# Patient Record
Sex: FEMALE | Race: WHITE | NOT HISPANIC OR LATINO | Employment: STUDENT | URBAN - METROPOLITAN AREA
[De-identification: names, ages, dates, MRNs, and addresses within clinical notes are randomized per-mention and may not be internally consistent; named-entity substitution may affect disease eponyms.]

---
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
## 2020-01-03 ENCOUNTER — APPOINTMENT (EMERGENCY)
Dept: CT IMAGING | Facility: HOSPITAL | Age: 20
End: 2020-01-03
Payer: COMMERCIAL

## 2020-01-03 ENCOUNTER — OFFICE VISIT (OUTPATIENT)
Dept: URGENT CARE | Facility: CLINIC | Age: 20
End: 2020-01-03
Payer: COMMERCIAL

## 2020-01-03 ENCOUNTER — APPOINTMENT (OUTPATIENT)
Dept: RADIOLOGY | Facility: CLINIC | Age: 20
End: 2020-01-03
Payer: COMMERCIAL

## 2020-01-03 ENCOUNTER — HOSPITAL ENCOUNTER (EMERGENCY)
Facility: HOSPITAL | Age: 20
Discharge: HOME | End: 2020-01-03
Attending: EMERGENCY MEDICINE | Admitting: EMERGENCY MEDICINE
Payer: COMMERCIAL

## 2020-01-03 ENCOUNTER — APPOINTMENT (EMERGENCY)
Dept: RADIOLOGY | Facility: HOSPITAL | Age: 20
End: 2020-01-03
Payer: COMMERCIAL

## 2020-01-03 VITALS
SYSTOLIC BLOOD PRESSURE: 165 MMHG | HEART RATE: 99 BPM | DIASTOLIC BLOOD PRESSURE: 110 MMHG | OXYGEN SATURATION: 98 % | TEMPERATURE: 98 F | RESPIRATION RATE: 19 BRPM

## 2020-01-03 VITALS
SYSTOLIC BLOOD PRESSURE: 124 MMHG | WEIGHT: 187 LBS | DIASTOLIC BLOOD PRESSURE: 82 MMHG | HEART RATE: 104 BPM | HEIGHT: 66 IN | OXYGEN SATURATION: 96 % | BODY MASS INDEX: 30.05 KG/M2 | RESPIRATION RATE: 18 BRPM | TEMPERATURE: 98.6 F

## 2020-01-03 DIAGNOSIS — R93.89 ABNORMAL CAT SCAN: ICD-10-CM

## 2020-01-03 DIAGNOSIS — R06.00 DYSPNEA: ICD-10-CM

## 2020-01-03 DIAGNOSIS — R06.2 WHEEZING: ICD-10-CM

## 2020-01-03 DIAGNOSIS — R05.9 COUGH: ICD-10-CM

## 2020-01-03 DIAGNOSIS — J40 BRONCHITIS: Primary | ICD-10-CM

## 2020-01-03 DIAGNOSIS — R06.02 SHORTNESS OF BREATH: Primary | ICD-10-CM

## 2020-01-03 LAB
ANION GAP SERPL CALCULATED.3IONS-SCNC: 12 MMOL/L (ref 4–13)
BASOPHILS # BLD AUTO: 0.07 THOUSANDS/ΜL (ref 0–0.1)
BASOPHILS NFR BLD AUTO: 1 % (ref 0–1)
BUN SERPL-MCNC: 9 MG/DL (ref 5–25)
CALCIUM SERPL-MCNC: 9.3 MG/DL (ref 8.3–10.1)
CHLORIDE SERPL-SCNC: 105 MMOL/L (ref 100–108)
CO2 SERPL-SCNC: 23 MMOL/L (ref 21–32)
CREAT SERPL-MCNC: 0.59 MG/DL (ref 0.6–1.3)
D DIMER PPP FEU-MCNC: 0.62 UG/ML FEU
EOSINOPHIL # BLD AUTO: 1.73 THOUSAND/ΜL (ref 0–0.61)
EOSINOPHIL NFR BLD AUTO: 16 % (ref 0–6)
ERYTHROCYTE [DISTWIDTH] IN BLOOD BY AUTOMATED COUNT: 13.2 % (ref 11.6–15.1)
EXT PREG TEST URINE: NEGATIVE
EXT. CONTROL ED NAV: NORMAL
GFR SERPL CREATININE-BSD FRML MDRD: 133 ML/MIN/1.73SQ M
GLUCOSE SERPL-MCNC: 97 MG/DL (ref 65–140)
HCT VFR BLD AUTO: 41.3 % (ref 34.8–46.1)
HGB BLD-MCNC: 13.5 G/DL (ref 11.5–15.4)
IMM GRANULOCYTES # BLD AUTO: 0.03 THOUSAND/UL (ref 0–0.2)
IMM GRANULOCYTES NFR BLD AUTO: 0 % (ref 0–2)
LYMPHOCYTES # BLD AUTO: 1.69 THOUSANDS/ΜL (ref 0.6–4.47)
LYMPHOCYTES NFR BLD AUTO: 16 % (ref 14–44)
MCH RBC QN AUTO: 30.8 PG (ref 26.8–34.3)
MCHC RBC AUTO-ENTMCNC: 32.7 G/DL (ref 31.4–37.4)
MCV RBC AUTO: 94 FL (ref 82–98)
MONOCYTES # BLD AUTO: 0.53 THOUSAND/ΜL (ref 0.17–1.22)
MONOCYTES NFR BLD AUTO: 5 % (ref 4–12)
NEUTROPHILS # BLD AUTO: 6.68 THOUSANDS/ΜL (ref 1.85–7.62)
NEUTS SEG NFR BLD AUTO: 62 % (ref 43–75)
NRBC BLD AUTO-RTO: 0 /100 WBCS
PLATELET # BLD AUTO: 334 THOUSANDS/UL (ref 149–390)
PMV BLD AUTO: 9 FL (ref 8.9–12.7)
POTASSIUM SERPL-SCNC: 4 MMOL/L (ref 3.5–5.3)
RBC # BLD AUTO: 4.38 MILLION/UL (ref 3.81–5.12)
SODIUM SERPL-SCNC: 140 MMOL/L (ref 136–145)
WBC # BLD AUTO: 10.73 THOUSAND/UL (ref 4.31–10.16)

## 2020-01-03 PROCEDURE — 36415 COLL VENOUS BLD VENIPUNCTURE: CPT | Performed by: EMERGENCY MEDICINE

## 2020-01-03 PROCEDURE — 71275 CT ANGIOGRAPHY CHEST: CPT

## 2020-01-03 PROCEDURE — 71046 X-RAY EXAM CHEST 2 VIEWS: CPT

## 2020-01-03 PROCEDURE — 99285 EMERGENCY DEPT VISIT HI MDM: CPT | Performed by: EMERGENCY MEDICINE

## 2020-01-03 PROCEDURE — 93005 ELECTROCARDIOGRAM TRACING: CPT

## 2020-01-03 PROCEDURE — 99285 EMERGENCY DEPT VISIT HI MDM: CPT

## 2020-01-03 PROCEDURE — 80048 BASIC METABOLIC PNL TOTAL CA: CPT | Performed by: EMERGENCY MEDICINE

## 2020-01-03 PROCEDURE — 94640 AIRWAY INHALATION TREATMENT: CPT

## 2020-01-03 PROCEDURE — 99213 OFFICE O/P EST LOW 20 MIN: CPT | Performed by: PHYSICIAN ASSISTANT

## 2020-01-03 PROCEDURE — 85025 COMPLETE CBC W/AUTO DIFF WBC: CPT | Performed by: EMERGENCY MEDICINE

## 2020-01-03 PROCEDURE — 85379 FIBRIN DEGRADATION QUANT: CPT | Performed by: EMERGENCY MEDICINE

## 2020-01-03 PROCEDURE — 81025 URINE PREGNANCY TEST: CPT | Performed by: EMERGENCY MEDICINE

## 2020-01-03 RX ORDER — PREDNISONE 20 MG/1
40 TABLET ORAL DAILY
Qty: 10 TABLET | Refills: 0 | Status: SHIPPED | OUTPATIENT
Start: 2020-01-03 | End: 2020-11-17

## 2020-01-03 RX ORDER — IPRATROPIUM BROMIDE AND ALBUTEROL SULFATE 2.5; .5 MG/3ML; MG/3ML
3 SOLUTION RESPIRATORY (INHALATION) ONCE
Status: COMPLETED | OUTPATIENT
Start: 2020-01-03 | End: 2020-01-03

## 2020-01-03 RX ADMIN — IPRATROPIUM BROMIDE AND ALBUTEROL SULFATE 3 ML: 2.5; .5 SOLUTION RESPIRATORY (INHALATION) at 12:36

## 2020-01-03 RX ADMIN — IOHEXOL 85 ML: 350 INJECTION, SOLUTION INTRAVENOUS at 13:44

## 2020-01-03 NOTE — ED PROVIDER NOTES
History  Chief Complaint   Patient presents with    Shortness of Breath     Pt reports being treated for bronchittis last week, placed on Abx by urgent care  Began noticing one of her feet were swollen, and believes she might have had a DVT or PE because her SOB is worsening  Pt sent from urgent care over concern for wheezing, dyspnea, and unilateral leg (R foot) swelling  Pt was seen at  approx one week ago and started on zpak and steroids for dyspnea and wheezing  She reports no improvement in her sxs since that time  She denies hemoptysis, syncope, presyncope, and h/o VTE but she is on birth control  She has no documented h/o asthma but reports prior wheezing and has extensive h/o eczema as well as family h/o lung disease  Prior to Admission Medications   Prescriptions Last Dose Informant Patient Reported? Taking? albuterol (PROVENTIL HFA) 90 mcg/act inhaler   No No   Sig: Inhale 2 puffs every 6 (six) hours as needed for wheezing   clobetasol propionate (OLUX-E) 0 05 % topical foam   Yes No   Sig: APPLY TO ECZEMA ON HANDS & LEGS TWICE A DAY X2WKS, THEN 2-3 DAYS A WK AS NEEDED FOR SEVERE FLARE UPS   drospirenone-ethinyl estradiol (ALLYSON) 3-0 03 MG per tablet   Yes No   Sig: Take 1 tablet by mouth daily      Facility-Administered Medications: None       Past Medical History:   Diagnosis Date    Eczema     UTI (urinary tract infection)        Past Surgical History:   Procedure Laterality Date    ADENOIDECTOMY      TONSILLECTOMY         Family History   Problem Relation Age of Onset    Hypertension Mother     COPD Father      I have reviewed and agree with the history as documented  Social History     Tobacco Use    Smoking status: Light Tobacco Smoker    Smokeless tobacco: Never Used   Substance Use Topics    Alcohol use: No    Drug use: Yes     Types: Marijuana        Review of Systems   Constitutional: Negative for chills and fever     Respiratory: Positive for cough, shortness of breath and wheezing  Negative for apnea, choking, chest tightness and stridor  Cardiovascular: Positive for leg swelling  Negative for chest pain and palpitations  All other systems reviewed and are negative  Physical Exam  Physical Exam   Constitutional: She is oriented to person, place, and time  She appears well-developed and well-nourished  No distress  HENT:   Head: Normocephalic and atraumatic  Eyes: Pupils are equal, round, and reactive to light  Conjunctivae and EOM are normal    Neck: Normal range of motion  Neck supple  No JVD present  Cardiovascular: Normal rate, regular rhythm, normal heart sounds and intact distal pulses  Pulmonary/Chest: Effort normal  No stridor  No respiratory distress  She has wheezes  She has no rales  She exhibits no tenderness  Abdominal: Soft  She exhibits no distension  There is no tenderness  There is no rebound and no guarding  Musculoskeletal: Normal range of motion  She exhibits no edema, tenderness or deformity  Neurological: She is alert and oriented to person, place, and time  No cranial nerve deficit or sensory deficit  She exhibits normal muscle tone  Coordination normal    Skin: Skin is warm and dry  Capillary refill takes less than 2 seconds  No rash noted  She is not diaphoretic  No erythema  No pallor  Nursing note and vitals reviewed        Vital Signs  ED Triage Vitals [01/03/20 1115]   Temperature Pulse Respirations Blood Pressure SpO2   98 °F (36 7 °C) 99 19 (!) 165/110 98 %      Temp src Heart Rate Source Patient Position - Orthostatic VS BP Location FiO2 (%)   -- Monitor Sitting Left arm --      Pain Score       --           Vitals:    01/03/20 1115   BP: (!) 165/110   Pulse: 99   Patient Position - Orthostatic VS: Sitting         Visual Acuity      ED Medications  Medications   ipratropium-albuterol (DUO-NEB) 0 5-2 5 mg/3 mL inhalation solution 3 mL (3 mL Nebulization Given 1/3/20 1236)   iohexol (OMNIPAQUE) 350 MG/ML injection (MULTI-DOSE) 85 mL (85 mL Intravenous Given 1/3/20 1344)       Diagnostic Studies  Results Reviewed     Procedure Component Value Units Date/Time    Basic metabolic panel [[id number]]  (Abnormal) Collected:  01/03/20 1234    Lab Status:  Final result Specimen:  Blood from Arm, Right Updated:  01/03/20 1305     Sodium 140 mmol/L      Potassium 4 0 mmol/L      Chloride 105 mmol/L      CO2 23 mmol/L      ANION GAP 12 mmol/L      BUN 9 mg/dL      Creatinine 0 59 mg/dL      Glucose 97 mg/dL      Calcium 9 3 mg/dL      eGFR 133 ml/min/1 73sq m     Narrative:       [place] guidelines for Chronic Kidney Disease (CKD):     Stage 1 with normal or high GFR (GFR > 90 mL/min/1 73 square meters)    Stage 2 Mild CKD (GFR = 60-89 mL/min/1 73 square meters)    Stage 3A Moderate CKD (GFR = 45-59 mL/min/1 73 square meters)    Stage 3B Moderate CKD (GFR = 30-44 mL/min/1 73 square meters)    Stage 4 Severe CKD (GFR = 15-29 mL/min/1 73 square meters)    Stage 5 End Stage CKD (GFR <15 mL/min/1 73 square meters)  Note: GFR calculation is accurate only with a steady state creatinine    CBC and differential [[id number]]  (Abnormal) Collected:  01/03/20 1234    Lab Status:  Final result Specimen:  Blood from Arm, Right Updated:  01/03/20 1255     WBC 10 73 Thousand/uL      RBC 4 38 Million/uL      Hemoglobin 13 5 g/dL      Hematocrit 41 3 %      MCV 94 fL      MCH 30 8 pg      MCHC 32 7 g/dL      RDW 13 2 %      MPV 9 0 fL      Platelets 011 Thousands/uL      nRBC 0 /100 WBCs      Neutrophils Relative 62 %      Immat GRANS % 0 %      Lymphocytes Relative 16 %      Monocytes Relative 5 %      Eosinophils Relative 16 %      Basophils Relative 1 %      Neutrophils Absolute 6 68 Thousands/µL      Immature Grans Absolute 0 03 Thousand/uL      Lymphocytes Absolute 1 69 Thousands/µL      Monocytes Absolute 0 53 Thousand/µL      Eosinophils Absolute 1 73 Thousand/µL      Basophils Absolute 0 07 Thousands/µL     D-dimer, quantitative [[id number]]  (Abnormal) Collected:  01/03/20 1140    Lab Status:  Final result Specimen:  Blood from Arm, Right Updated:  01/03/20 1209     D-Dimer, Quant 0 62 ug/ml FEU     POCT pregnancy, urine [[id number]]  (Normal) Resulted:  01/03/20 1153    Lab Status:  Final result Updated:  01/03/20 1153     EXT PREG TEST UR (Ref: Negative) negative     Control valid                 CTA ED chest PE study   Final Result by Alma Peters MD (01/03 1415)      No evidence of pulmonary embolus  Scattered bilateral groundglass opacities which could reflect pneumonia in appropriate clinical setting  Mediastinal and hilar lymphadenopathy, this may be reactive however consider CT follow-up to ensure resolution  Indeterminate 2 8 cm hepatic lesion for which hepatic protocol MRI is recommended  Workstation performed: [id number]         XR chest 2 views   Final Result by Wagner Gupta MD (01/03 1417)      No acute cardiopulmonary disease  Workstation performed: [id number]                    Procedures  ECG 12 Lead Documentation Only  Date/Time: 1/3/2020 11:44 AM  Performed by: Debbie Paredes MD  Authorized by: Debbie Paredes MD     Indications / Diagnosis:  106  ECG reviewed by me, the ED Provider: yes    Patient location:  ED  Previous ECG:     Previous ECG:  Unavailable  Interpretation:     Interpretation: non-specific    Rate:     ECG rate:  106    ECG rate assessment: tachycardic    Rhythm:     Rhythm: sinus rhythm    Comments:      ST  RBBB  No acute ischemic changes  No ekg available for comparison  ED Course                               MDM  Number of Diagnoses or Management Options  Abnormal CAT scan:   Bronchitis:   Dyspnea:   Wheezing:   Diagnosis management comments: Dyspnea and wheezing  Suspect viral uri w bronchitis  Discussed CT findings w pt and gave copy of CT results, will d/w PCP  Given wheezing will resume steroids           Disposition  Final diagnoses:   Bronchitis   Wheezing   Dyspnea   Abnormal CAT scan     Time reflects when diagnosis was documented in both MDM as applicable and the Disposition within this note     Time User Action Codes Description Comment    1/3/2020  2:20 PM Chairez, Hunter Add [J40] Bronchitis     1/3/2020  2:20 PM Dothan Ryan Add [R06 2] Wheezing     1/3/2020  2:21 PM Chairez, Hunter Add [R06 00] Dyspnea     1/3/2020  2:21 PM Dothan Ryan Add [R93 89] Abnormal CAT scan       ED Disposition     ED Disposition Condition Date/Time Comment    Discharge Stable Fri Chet 3, 2020  2:20 PM Lakesha Stevenson discharge to home/self care  Follow-up Information     Follow up With Specialties Details Why Contact Info    Satish Tee MD Emergency Medicine, Internal Medicine  have an mri of your liver performed  [address] [place] [address]  [phone]            Discharge Medication List as of 1/3/2020  2:22 PM      START taking these medications    Details   predniSONE 20 mg tablet Take 2 tablets (40 mg total) by mouth daily, Starting Fri 1/3/2020, Print         CONTINUE these medications which have NOT CHANGED    Details   albuterol (PROVENTIL HFA) 90 mcg/act inhaler Inhale 2 puffs every 6 (six) hours as needed for wheezing, Starting Fri 12/20/2019, Normal      clobetasol propionate (OLUX-E) 0 05 % topical foam APPLY TO ECZEMA ON HANDS & LEGS TWICE A DAY X2WKS, THEN 2-3 DAYS A WK AS NEEDED FOR SEVERE FLARE UPS, Historical Med      drospirenone-ethinyl estradiol (ALLYSON) 3-0 03 MG per tablet Take 1 tablet by mouth daily, Starting Thu 10/24/2019, Historical Med           No discharge procedures on file      ED Provider  Electronically Signed by           Mariah Jaimes MD  01/03/20 9679

## 2020-01-03 NOTE — PROGRESS NOTES
[facility]        NAME: Colleen Ramirez is a 23 y o  female  : 2000    MRN: [id number]  DATE: January 3, 2020  TIME: 10:43 AM    Assessment and Plan   Shortness of breath [R06 02]  1  Shortness of breath  XR chest pa & lateral    Transfer to other facility     CXR reviewed by myself; negative for pneumonia  Discussed case with Dr Ethel Diaz at [facility]  Patient will be transported to [facility] via personal vehicle  Patient Instructions     Follow up with PCP in 3-5 days  Proceed to  ER if symptoms worsen  Chief Complaint     Chief Complaint   Patient presents with    Cough     was here on  and given z pack and prednisone for URI  states she felt better but her symptoms returned and is now complaining of SOB, wheezing, and productive cough for dark green sputum  History of Present Illness       70-year-old female with no significant past medical history presents for evaluation of shortness of breath  Patient was seen and treated for a URI on 2019  She completed a course of Z-Farzad and steroids with no relief of symptoms  Patient has concerns for increased shortness of breath and wheezing  Patient states over the last 3 days the shortness of breath has significantly worsened  Patient also shares with me that prior to being seen on 2019 she had right lower extremity swelling and warmth  Patient showed me a picture of the right foot that was swollen and warm to the touch that lasted about 4 days  Patient is currently taking oral contraceptives  Denies recent travel  Denies fever or chills  Review of Systems   Review of Systems   Constitutional: Negative for chills, fatigue and fever  HENT: Positive for congestion  Negative for ear pain, sinus pain, sore throat and trouble swallowing  Eyes: Negative for pain, discharge and redness  Respiratory: Positive for cough, shortness of breath and wheezing  Negative for chest tightness  Cardiovascular: Negative for chest pain, palpitations and leg swelling  Gastrointestinal: Negative for abdominal pain, diarrhea, nausea and vomiting  Musculoskeletal: Negative for arthralgias, joint swelling and myalgias  Skin: Negative for rash  Neurological: Negative for dizziness, weakness, numbness and headaches  Current Medications       Current Outpatient Medications:     albuterol (PROVENTIL HFA) 90 mcg/act inhaler, Inhale 2 puffs every 6 (six) hours as needed for wheezing, Disp: 6 7 g, Rfl: 0    clobetasol propionate (OLUX-E) 0 05 % topical foam, APPLY TO ECZEMA ON HANDS & LEGS TWICE A DAY X2WKS, THEN 2-3 DAYS A WK AS NEEDED FOR SEVERE FLARE UPS, Disp: , Rfl: 2    drospirenone-ethinyl estradiol (ALLYSON) 3-0 03 MG per tablet, Take 1 tablet by mouth daily, Disp: , Rfl: 3    Current Allergies     Allergies as of 01/03/2020    (No Known Allergies)            The following portions of the patient's history were reviewed and updated as appropriate: allergies, current medications, past family history, past medical history, past social history, past surgical history and problem list      Past Medical History:   Diagnosis Date    Eczema     UTI (urinary tract infection)        Past Surgical History:   Procedure Laterality Date    ADENOIDECTOMY      TONSILLECTOMY         Family History   Problem Relation Age of Onset    Hypertension Mother     COPD Father          Medications have been verified  Objective   /82 (BP Location: Left arm, Patient Position: Sitting)   Pulse 104   Temp 98 6 °F (37 °C) (Temporal)   Resp 18   Ht 5' 6" (1 676 m)   Wt 84 8 kg (187 lb)   SpO2 96%   BMI 30 18 kg/m²        Physical Exam     Physical Exam   Constitutional: She is oriented to person, place, and time  She appears well-developed and well-nourished  No distress  HENT:   Head: Normocephalic     Right Ear: External ear normal    Left Ear: External ear normal    Mouth/Throat: Oropharynx is clear and moist    Eyes: Pupils are equal, round, and reactive to light  Conjunctivae and EOM are normal    Neck: Normal range of motion  Neck supple  Cardiovascular: Regular rhythm and normal heart sounds  Tachycardia present  No murmur heard  Pulmonary/Chest: Effort normal  No respiratory distress  She has wheezes  She has no rhonchi  She has no rales  Abdominal: Soft  Bowel sounds are normal  There is no tenderness  Musculoskeletal: Normal range of motion  Lymphadenopathy:     She has no cervical adenopathy  Neurological: She is alert and oriented to person, place, and time  She has normal reflexes  Skin: Skin is warm and dry  Psychiatric: She has a normal mood and affect  Nursing note and vitals reviewed

## 2020-01-04 LAB
ATRIAL RATE: 106 BPM
P AXIS: 69 DEGREES
PR INTERVAL: 116 MS
QRS AXIS: 80 DEGREES
QRSD INTERVAL: 98 MS
QT INTERVAL: 324 MS
QTC INTERVAL: 430 MS
T WAVE AXIS: 56 DEGREES
VENTRICULAR RATE: 106 BPM

## 2020-01-04 PROCEDURE — 93010 ELECTROCARDIOGRAM REPORT: CPT | Performed by: INTERNAL MEDICINE

## 2020-05-17 ENCOUNTER — TELEPHONE (OUTPATIENT)
Dept: OTHER | Facility: OTHER | Age: 20
End: 2020-05-17

## 2020-07-27 ENCOUNTER — OFFICE VISIT (OUTPATIENT)
Dept: URGENT CARE | Facility: CLINIC | Age: 20
End: 2020-07-27
Payer: COMMERCIAL

## 2020-07-27 VITALS
RESPIRATION RATE: 18 BRPM | HEART RATE: 92 BPM | SYSTOLIC BLOOD PRESSURE: 118 MMHG | HEIGHT: 66 IN | TEMPERATURE: 98.2 F | WEIGHT: 195 LBS | DIASTOLIC BLOOD PRESSURE: 78 MMHG | BODY MASS INDEX: 31.34 KG/M2 | OXYGEN SATURATION: 97 %

## 2020-07-27 DIAGNOSIS — R30.0 DYSURIA: Primary | ICD-10-CM

## 2020-07-27 LAB
SL AMB  POCT GLUCOSE, UA: ABNORMAL
SL AMB LEUKOCYTE ESTERASE,UA: ABNORMAL
SL AMB POCT BILIRUBIN,UA: ABNORMAL
SL AMB POCT BLOOD,UA: ABNORMAL
SL AMB POCT CLARITY,UA: ABNORMAL
SL AMB POCT COLOR,UA: ABNORMAL
SL AMB POCT KETONES,UA: ABNORMAL
SL AMB POCT NITRITE,UA: ABNORMAL
SL AMB POCT PH,UA: 5
SL AMB POCT SPECIFIC GRAVITY,UA: 1.01
SL AMB POCT URINE PROTEIN: 30
SL AMB POCT UROBILINOGEN: 0.2

## 2020-07-27 PROCEDURE — 87077 CULTURE AEROBIC IDENTIFY: CPT | Performed by: PREVENTIVE MEDICINE

## 2020-07-27 PROCEDURE — 99213 OFFICE O/P EST LOW 20 MIN: CPT | Performed by: PREVENTIVE MEDICINE

## 2020-07-27 PROCEDURE — 81002 URINALYSIS NONAUTO W/O SCOPE: CPT | Performed by: PREVENTIVE MEDICINE

## 2020-07-27 PROCEDURE — 87186 SC STD MICRODIL/AGAR DIL: CPT | Performed by: PREVENTIVE MEDICINE

## 2020-07-27 PROCEDURE — 87086 URINE CULTURE/COLONY COUNT: CPT | Performed by: PREVENTIVE MEDICINE

## 2020-07-27 RX ORDER — PHENAZOPYRIDINE HYDROCHLORIDE 100 MG/1
100 TABLET, FILM COATED ORAL 3 TIMES DAILY PRN
Qty: 20 TABLET | Refills: 0 | Status: SHIPPED | OUTPATIENT
Start: 2020-07-27 | End: 2020-11-17

## 2020-07-27 RX ORDER — SULFAMETHOXAZOLE AND TRIMETHOPRIM 800; 160 MG/1; MG/1
1 TABLET ORAL EVERY 12 HOURS SCHEDULED
Qty: 10 TABLET | Refills: 0 | Status: SHIPPED | OUTPATIENT
Start: 2020-07-27 | End: 2020-08-01

## 2020-07-27 NOTE — PATIENT INSTRUCTIONS
Dysuria   AMBULATORY CARE:   Dysuria  is trouble urinating, or pain, burning, or discomfort when you urinate  Dysuria is usually a symptom of another problem, such as a blockage or urinary tract infection  Common symptoms include the following:   · Fever     · Cloudy, bad smelling urine     · Urge to urinate often but urinating little     · Back, side, or abdominal pain     · Blood in your urine     · Discharge that smells bad     · Itching  Seek care immediately if:   · You have severe back, side, or abdominal pain  · You have fever and shaking chills  · You vomit several times in a row  Contact your healthcare provider if:   · Your symptoms do not go away, even after treatment  · You have questions or concerns about your condition or care  Treatment for dysuria  may include medicines to treat a bacterial infection or help decrease bladder spasms  Manage your dysuria:   · Drink more liquids  Liquids help flush out bacteria that may be causing an infection  Ask your healthcare provider how much liquid to drink each day and which liquids are best for you  · Take sitz baths as directed  Fill a bathtub with 4 to 6 inches of warm water  You may also use a sitz bath pan that fits over a toilet  Sit in the sitz bath for 20 minutes  Do this 2 to 3 times a day, or as directed  The warm water can help decrease pain and swelling  Follow up with your healthcare provider as directed:  Write down your questions so you remember to ask them during your visits  © 2017 [employer] Information is for End User's use only and may not be sold, redistributed or otherwise used for commercial purposes  All illustrations and images included in CareNotes® are the copyrighted property of [employer] , [employer]  or Leroy Keene  The above information is an  only  It is not intended as medical advice for individual conditions or treatments   Talk to your doctor, nurse or pharmacist before following any medical regimen to see if it is safe and effective for you

## 2020-07-29 NOTE — PROGRESS NOTES
[facility]        NAME: Ya Means is a 21 y o  female  : 2000    MRN: [id number]  DATE: 2020  TIME: 10:41 AM    Assessment and Plan   Dysuria [R30 0]  1  Dysuria  POCT urine dip    Urine culture    sulfamethoxazole-trimethoprim (BACTRIM DS) 800-160 mg per tablet    phenazopyridine (PYRIDIUM) 100 mg tablet    Ambulatory referral to Urology         Patient Instructions       Follow up with PCP in 3-5 days  Proceed to  ER if symptoms worsen  Chief Complaint     Chief Complaint   Patient presents with    Possible UTI     symptoms started friday with painful urintation and bladder pain         History of Present Illness       Urinary Tract Infection    This is a new problem  The current episode started in the past 7 days  The problem occurs every urination  The problem has been unchanged  The quality of the pain is described as burning  The pain is at a severity of 5/10  The pain is moderate  There has been no fever  She is not sexually active  There is no history of pyelonephritis  Associated symptoms include flank pain, frequency and urgency  She has tried nothing for the symptoms  The treatment provided no relief  Review of Systems   Review of Systems   Constitutional: Negative  Respiratory: Negative  Cardiovascular: Negative  Genitourinary: Positive for flank pain, frequency and urgency  All other systems reviewed and are negative          Current Medications       Current Outpatient Medications:     albuterol (PROVENTIL HFA) 90 mcg/act inhaler, Inhale 2 puffs every 6 (six) hours as needed for wheezing (Patient not taking: Reported on 2020), Disp: 6 7 g, Rfl: 0    clobetasol propionate (OLUX-E) 0 05 % topical foam, APPLY TO ECZEMA ON HANDS & LEGS TWICE A DAY X2WKS, THEN 2-3 DAYS A WK AS NEEDED FOR SEVERE FLARE UPS, Disp: , Rfl: 2    drospirenone-ethinyl estradiol (ALLYSON) 3-0 03 MG per tablet, Take 1 tablet by mouth daily, Disp: , Rfl: 3    phenazopyridine (PYRIDIUM) 100 mg tablet, Take 1 tablet (100 mg total) by mouth 3 (three) times a day as needed for bladder spasms, Disp: 20 tablet, Rfl: 0    predniSONE 20 mg tablet, Take 2 tablets (40 mg total) by mouth daily (Patient not taking: Reported on 7/27/2020), Disp: 10 tablet, Rfl: 0    sulfamethoxazole-trimethoprim (BACTRIM DS) 800-160 mg per tablet, Take 1 tablet by mouth every 12 (twelve) hours for 5 days, Disp: 10 tablet, Rfl: 0    Current Allergies     Allergies as of 07/27/2020 - Reviewed 07/27/2020   Allergen Reaction Noted    Dust mite extract Other (See Comments) 09/12/2017    Pollen extract  01/26/2020            The following portions of the patient's history were reviewed and updated as appropriate: allergies, current medications, past family history, past medical history, past social history, past surgical history and problem list      Past Medical History:   Diagnosis Date    Eczema     UTI (urinary tract infection)        Past Surgical History:   Procedure Laterality Date    TONSILLECTOMY AND ADENOIDECTOMY         Family History   Problem Relation Age of Onset    Hypertension Mother     COPD Father          Medications have been verified  Objective   /78   Pulse 92   Temp 98 2 °F (36 8 °C) (Oral)   Resp 18   Ht 5' 6" (1 676 m)   Wt 88 5 kg (195 lb)   LMP 07/24/2020 (Exact Date)   SpO2 97%   BMI 31 47 kg/m²        Physical Exam     Physical Exam   Constitutional: She appears well-developed and well-nourished  Cardiovascular: Normal rate and regular rhythm  Pulmonary/Chest: Effort normal and breath sounds normal    Abdominal: Soft  There is tenderness in the suprapubic area  Nursing note and vitals reviewed

## 2020-07-30 LAB — BACTERIA UR CULT: ABNORMAL

## 2020-09-23 NOTE — PROGRESS NOTES
Assessment and plan:       1  Recurrent urinary tract infections  - Patient has been having urinary tract infections after sexual activity for the last 3 years  - Current partner is circumcised  - She was given a trial of postcoital antibiotics and will follow up in 6 months for symptom reassessment  Should she become symptomatic in interim, she has been instructed to contact our office so we can help arrange for urine testing and treatment  2  Bilateral flank pain  - Patient was encouraged to hydrate well with water, have normal bowel movements, and will have an ultrasound in the near future  We will let her know these results  Dann Gilmore PA-C      Chief Complaint     Chief Complaint   Patient presents with    recurrent UTI         History of Present Illness     Eunice Betancourt is a 21 y o  female patient establishing care with [address] [place] [address] for Urology for recurrent UTIs  Patient had 2 positive cultures for low to moderate levels of bacteria in 2019  Patient had a culture positive for Enterobacter aerogenes on 07/27/2020  This was treated with Bactrim  Patient reports that she had urinary tract infections very frequently in 2017 and 2018 after sexual activity  She reports that at that time her boyfriend was not circumcised  Since then she continues to have them intermittently after sexual activity  Her primary symptom is dysuria and bladder spasms  She is emptying well today with a PVR of 10 mL  She also reports intermittent bilateral flank pain, right greater than left  Laboratory     Lab Results   Component Value Date    CREATININE 0 59 (L) 01/03/2020       No results found for: PSA    Recent Results (from the past 1 hour(s))   POCT Measure PVR    Collection Time: 09/28/20  9:34 AM   Result Value Ref Range    POST-VOID RESIDUAL VOLUME, ML POC 10 mL         Review of Systems     Review of Systems   Constitutional: Negative for chills and fever  HENT: Negative  Eyes: Negative  Respiratory: Negative for cough and shortness of breath  Cardiovascular: Negative for chest pain  Gastrointestinal: Negative for constipation, diarrhea, nausea and vomiting  Endocrine: Negative  Genitourinary: Positive for flank pain  Negative for difficulty urinating, dyspareunia, dysuria, enuresis, frequency, hematuria and urgency  Musculoskeletal: Positive for back pain  Skin: Negative  Allergies     Allergies   Allergen Reactions    Dust Mite Extract Other (See Comments)     Pt has allergy to dust,grass,mold,and trees  Pt gets post nasal drip   Pollen Extract        Physical Exam     Physical Exam  Vitals signs and nursing note reviewed  Constitutional:       General: She is not in acute distress  Appearance: Normal appearance  She is well-developed  She is not ill-appearing, toxic-appearing or diaphoretic  HENT:      Head: Normocephalic and atraumatic  Right Ear: External ear normal       Left Ear: External ear normal    Eyes:      General: No scleral icterus  Right eye: No discharge  Left eye: No discharge  Cardiovascular:      Rate and Rhythm: Normal rate  Pulmonary:      Effort: Pulmonary effort is normal    Abdominal:      General: Abdomen is flat  Palpations: Abdomen is soft  Tenderness: There is right CVA tenderness and left CVA tenderness  Musculoskeletal:      Comments: Ambulates independently   Skin:     General: Skin is warm and dry  Neurological:      Mental Status: She is alert and oriented to person, place, and time  Psychiatric:         Mood and Affect: Mood normal          Behavior: Behavior normal          Thought Content:  Thought content normal          Judgment: Judgment normal            Vital Signs     Vitals:    09/28/20 0930   BP: 132/90   BP Location: Left arm   Patient Position: Sitting   Cuff Size: Standard   Pulse: 95   Temp: 98 2 °F (36 8 °C)   Weight: 93 kg (205 lb)   Height: 5' 6" (1 386 m)         Current Medications       Current Outpatient Medications:     clobetasol propionate (OLUX-E) 0 05 % topical foam, APPLY TO ECZEMA ON HANDS & LEGS TWICE A DAY X2WKS, THEN 2-3 DAYS A WK AS NEEDED FOR SEVERE FLARE UPS, Disp: , Rfl: 2    hydrOXYzine HCL (ATARAX) 10 mg tablet, TAKE 1 3 TABLETS BY MOUTH AT BEDTIME AS NEEDED FOR ITCHING, Disp: , Rfl:     albuterol (PROVENTIL HFA) 90 mcg/act inhaler, Inhale 2 puffs every 6 (six) hours as needed for wheezing (Patient not taking: Reported on 7/27/2020), Disp: 6 7 g, Rfl: 0    drospirenone-ethinyl estradiol (ALLYSON) 3-0 03 MG per tablet, Take 1 tablet by mouth daily, Disp: , Rfl: 3    nitrofurantoin (MACRODANTIN) 50 mg capsule, Take 1 capsule (50 mg total) by mouth as needed (After sexual activity), Disp: 30 capsule, Rfl: 3    phenazopyridine (PYRIDIUM) 100 mg tablet, Take 1 tablet (100 mg total) by mouth 3 (three) times a day as needed for bladder spasms (Patient not taking: Reported on 9/28/2020), Disp: 20 tablet, Rfl: 0    predniSONE 20 mg tablet, Take 2 tablets (40 mg total) by mouth daily (Patient not taking: Reported on 7/27/2020), Disp: 10 tablet, Rfl: 0      Active Problems     There is no problem list on file for this patient          Past Medical History     Past Medical History:   Diagnosis Date    Eczema     UTI (urinary tract infection)          Surgical History     Past Surgical History:   Procedure Laterality Date    TONSILLECTOMY AND ADENOIDECTOMY           Family History     Family History   Problem Relation Age of Onset    Hypertension Mother     COPD Father          Social History     Social History       Radiology

## 2020-09-28 ENCOUNTER — OFFICE VISIT (OUTPATIENT)
Dept: UROLOGY | Facility: CLINIC | Age: 20
End: 2020-09-28
Payer: COMMERCIAL

## 2020-09-28 VITALS
DIASTOLIC BLOOD PRESSURE: 90 MMHG | SYSTOLIC BLOOD PRESSURE: 132 MMHG | HEART RATE: 95 BPM | BODY MASS INDEX: 32.95 KG/M2 | TEMPERATURE: 98.2 F | HEIGHT: 66 IN | WEIGHT: 205 LBS

## 2020-09-28 DIAGNOSIS — N39.0 RECURRENT UTI: Primary | ICD-10-CM

## 2020-09-28 DIAGNOSIS — R10.9 FLANK PAIN: ICD-10-CM

## 2020-09-28 DIAGNOSIS — R30.0 DYSURIA: ICD-10-CM

## 2020-09-28 LAB — POST-VOID RESIDUAL VOLUME, ML POC: 10 ML

## 2020-09-28 PROCEDURE — 51798 US URINE CAPACITY MEASURE: CPT | Performed by: PHYSICIAN ASSISTANT

## 2020-09-28 PROCEDURE — 99243 OFF/OP CNSLTJ NEW/EST LOW 30: CPT | Performed by: PHYSICIAN ASSISTANT

## 2020-09-28 RX ORDER — HYDROXYZINE HYDROCHLORIDE 10 MG/1
TABLET, FILM COATED ORAL
COMMUNITY
Start: 2020-09-02

## 2020-09-28 RX ORDER — NITROFURANTOIN MACROCRYSTALS 50 MG/1
50 CAPSULE ORAL AS NEEDED
Qty: 30 CAPSULE | Refills: 3 | Status: SHIPPED | OUTPATIENT
Start: 2020-09-28 | End: 2021-02-04

## 2020-10-09 ENCOUNTER — HOSPITAL ENCOUNTER (OUTPATIENT)
Dept: ULTRASOUND IMAGING | Facility: HOSPITAL | Age: 20
Discharge: HOME | End: 2020-10-09
Payer: COMMERCIAL

## 2020-10-09 DIAGNOSIS — R10.9 FLANK PAIN: ICD-10-CM

## 2020-10-09 PROCEDURE — 76770 US EXAM ABDO BACK WALL COMP: CPT

## 2020-10-12 ENCOUNTER — TELEPHONE (OUTPATIENT)
Dept: UROLOGY | Facility: AMBULATORY SURGERY CENTER | Age: 20
End: 2020-10-12

## 2020-10-12 DIAGNOSIS — N39.0 RECURRENT UTI: Primary | ICD-10-CM

## 2020-10-13 ENCOUNTER — LAB (OUTPATIENT)
Dept: LAB | Facility: CLINIC | Age: 20
End: 2020-10-13
Payer: COMMERCIAL

## 2020-10-13 ENCOUNTER — TELEPHONE (OUTPATIENT)
Dept: UROLOGY | Facility: CLINIC | Age: 20
End: 2020-10-13

## 2020-10-13 DIAGNOSIS — N39.0 RECURRENT UTI: Primary | ICD-10-CM

## 2020-10-13 DIAGNOSIS — N39.0 RECURRENT UTI: ICD-10-CM

## 2020-10-13 LAB
BACTERIA UR QL AUTO: NORMAL /HPF
BILIRUB UR QL STRIP: NEGATIVE
CLARITY UR: CLEAR
COLOR UR: YELLOW
GLUCOSE UR STRIP-MCNC: NEGATIVE MG/DL
HGB UR QL STRIP.AUTO: NEGATIVE
HYALINE CASTS #/AREA URNS LPF: NORMAL /LPF
KETONES UR STRIP-MCNC: NEGATIVE MG/DL
LEUKOCYTE ESTERASE UR QL STRIP: NEGATIVE
NITRITE UR QL STRIP: NEGATIVE
NON-SQ EPI CELLS URNS QL MICRO: NORMAL /HPF
PH UR STRIP.AUTO: 6 [PH]
PROT UR STRIP-MCNC: NEGATIVE MG/DL
RBC #/AREA URNS AUTO: NORMAL /HPF
SP GR UR STRIP.AUTO: 1.01 (ref 1–1.03)
UROBILINOGEN UR QL STRIP.AUTO: 0.2 E.U./DL
WBC #/AREA URNS AUTO: NORMAL /HPF

## 2020-10-13 PROCEDURE — 87086 URINE CULTURE/COLONY COUNT: CPT

## 2020-10-13 PROCEDURE — 81001 URINALYSIS AUTO W/SCOPE: CPT

## 2020-10-14 LAB — BACTERIA UR CULT: NORMAL

## 2020-10-19 ENCOUNTER — OFFICE VISIT (OUTPATIENT)
Dept: OBGYN CLINIC | Facility: CLINIC | Age: 20
End: 2020-10-19
Payer: COMMERCIAL

## 2020-10-19 VITALS
SYSTOLIC BLOOD PRESSURE: 110 MMHG | DIASTOLIC BLOOD PRESSURE: 72 MMHG | WEIGHT: 209 LBS | TEMPERATURE: 98.2 F | BODY MASS INDEX: 33.73 KG/M2

## 2020-10-19 DIAGNOSIS — Z11.3 SCREENING FOR STD (SEXUALLY TRANSMITTED DISEASE): ICD-10-CM

## 2020-10-19 DIAGNOSIS — N89.8 VAGINAL DRYNESS: Primary | ICD-10-CM

## 2020-10-19 PROCEDURE — 87491 CHLMYD TRACH DNA AMP PROBE: CPT | Performed by: NURSE PRACTITIONER

## 2020-10-19 PROCEDURE — 87591 N.GONORRHOEAE DNA AMP PROB: CPT | Performed by: NURSE PRACTITIONER

## 2020-10-19 PROCEDURE — 99202 OFFICE O/P NEW SF 15 MIN: CPT | Performed by: NURSE PRACTITIONER

## 2020-10-22 LAB
C TRACH DNA SPEC QL NAA+PROBE: NEGATIVE
N GONORRHOEA DNA SPEC QL NAA+PROBE: NEGATIVE

## 2020-10-27 ENCOUNTER — TELEPHONE (OUTPATIENT)
Dept: UROLOGY | Facility: CLINIC | Age: 20
End: 2020-10-27

## 2020-11-17 ENCOUNTER — OFFICE VISIT (OUTPATIENT)
Dept: INTERNAL MEDICINE CLINIC | Facility: CLINIC | Age: 20
End: 2020-11-17
Payer: COMMERCIAL

## 2020-11-17 VITALS
WEIGHT: 211 LBS | DIASTOLIC BLOOD PRESSURE: 70 MMHG | TEMPERATURE: 97.3 F | SYSTOLIC BLOOD PRESSURE: 118 MMHG | BODY MASS INDEX: 31.98 KG/M2 | OXYGEN SATURATION: 99 % | HEIGHT: 68 IN | HEART RATE: 88 BPM | RESPIRATION RATE: 14 BRPM

## 2020-11-17 DIAGNOSIS — Z13.1 SCREENING FOR DIABETES MELLITUS: ICD-10-CM

## 2020-11-17 DIAGNOSIS — F51.01 PRIMARY INSOMNIA: Primary | ICD-10-CM

## 2020-11-17 DIAGNOSIS — Z13.220 SCREENING, LIPID: ICD-10-CM

## 2020-11-17 DIAGNOSIS — Z13.0 SCREENING FOR DEFICIENCY ANEMIA: ICD-10-CM

## 2020-11-17 DIAGNOSIS — E55.9 VITAMIN D DEFICIENCY: ICD-10-CM

## 2020-11-17 DIAGNOSIS — Z13.29 SCREENING FOR THYROID DISORDER: ICD-10-CM

## 2020-11-17 DIAGNOSIS — E66.09 CLASS 2 OBESITY DUE TO EXCESS CALORIES WITHOUT SERIOUS COMORBIDITY WITH BODY MASS INDEX (BMI) OF 36.0 TO 36.9 IN ADULT: ICD-10-CM

## 2020-11-17 DIAGNOSIS — J18.9 PNEUMONIA OF BOTH LUNGS DUE TO INFECTIOUS ORGANISM, UNSPECIFIED PART OF LUNG: ICD-10-CM

## 2020-11-17 PROCEDURE — 99203 OFFICE O/P NEW LOW 30 MIN: CPT | Performed by: PHYSICIAN ASSISTANT

## 2020-11-17 PROCEDURE — 1036F TOBACCO NON-USER: CPT | Performed by: PHYSICIAN ASSISTANT

## 2020-11-17 PROCEDURE — 3008F BODY MASS INDEX DOCD: CPT | Performed by: PHYSICIAN ASSISTANT

## 2020-11-17 PROCEDURE — 3725F SCREEN DEPRESSION PERFORMED: CPT | Performed by: PHYSICIAN ASSISTANT

## 2020-11-17 RX ORDER — BUPROPION HYDROCHLORIDE 150 MG/1
150 TABLET, EXTENDED RELEASE ORAL DAILY
Qty: 30 TABLET | Refills: 2 | Status: SHIPPED | OUTPATIENT
Start: 2020-11-17 | End: 2020-12-22 | Stop reason: SDUPTHER

## 2020-11-17 RX ORDER — CLINDAMYCIN PHOSPHATE 10 UG/ML
LOTION TOPICAL
COMMUNITY
Start: 2020-11-09

## 2020-11-17 RX ORDER — BENZOYL PEROXIDE 50 MG/ML
LIQUID TOPICAL
COMMUNITY
Start: 2020-11-09

## 2020-11-17 RX ORDER — TRAZODONE HYDROCHLORIDE 50 MG/1
50-100 TABLET ORAL
Qty: 60 TABLET | Refills: 0 | Status: SHIPPED | OUTPATIENT
Start: 2020-11-17 | End: 2021-02-04

## 2020-11-18 ENCOUNTER — LAB (OUTPATIENT)
Dept: LAB | Facility: CLINIC | Age: 20
End: 2020-11-18
Payer: COMMERCIAL

## 2020-11-18 ENCOUNTER — TRANSCRIBE ORDERS (OUTPATIENT)
Dept: LAB | Facility: CLINIC | Age: 20
End: 2020-11-18

## 2020-11-18 DIAGNOSIS — Z13.0 SCREENING FOR DEFICIENCY ANEMIA: ICD-10-CM

## 2020-11-18 DIAGNOSIS — Z13.220 SCREENING, LIPID: ICD-10-CM

## 2020-11-18 DIAGNOSIS — Z13.29 SCREENING FOR THYROID DISORDER: ICD-10-CM

## 2020-11-18 DIAGNOSIS — Z13.1 SCREENING FOR DIABETES MELLITUS: ICD-10-CM

## 2020-11-18 DIAGNOSIS — J18.9 PNEUMONIA OF BOTH LUNGS DUE TO INFECTIOUS ORGANISM, UNSPECIFIED PART OF LUNG: ICD-10-CM

## 2020-11-18 DIAGNOSIS — E55.9 VITAMIN D DEFICIENCY: ICD-10-CM

## 2020-11-18 PROBLEM — E66.09 CLASS 2 OBESITY DUE TO EXCESS CALORIES WITHOUT SERIOUS COMORBIDITY WITH BODY MASS INDEX (BMI) OF 36.0 TO 36.9 IN ADULT: Status: ACTIVE | Noted: 2020-11-18

## 2020-11-18 PROBLEM — F51.01 PRIMARY INSOMNIA: Status: ACTIVE | Noted: 2020-11-18

## 2020-11-18 LAB
25(OH)D3 SERPL-MCNC: 19.5 NG/ML (ref 30–100)
ALBUMIN SERPL BCP-MCNC: 4.3 G/DL (ref 3.5–5)
ALP SERPL-CCNC: 93 U/L (ref 46–116)
ALT SERPL W P-5'-P-CCNC: 27 U/L (ref 12–78)
ANION GAP SERPL CALCULATED.3IONS-SCNC: 5 MMOL/L (ref 4–13)
AST SERPL W P-5'-P-CCNC: 12 U/L (ref 5–45)
BASOPHILS # BLD AUTO: 0.04 THOUSANDS/ΜL (ref 0–0.1)
BASOPHILS NFR BLD AUTO: 1 % (ref 0–1)
BILIRUB SERPL-MCNC: 0.48 MG/DL (ref 0.2–1)
BUN SERPL-MCNC: 14 MG/DL (ref 5–25)
CALCIUM SERPL-MCNC: 9.8 MG/DL (ref 8.3–10.1)
CHLORIDE SERPL-SCNC: 105 MMOL/L (ref 100–108)
CHOLEST SERPL-MCNC: 165 MG/DL (ref 50–200)
CO2 SERPL-SCNC: 27 MMOL/L (ref 21–32)
CREAT SERPL-MCNC: 0.53 MG/DL (ref 0.6–1.3)
EOSINOPHIL # BLD AUTO: 0.26 THOUSAND/ΜL (ref 0–0.61)
EOSINOPHIL NFR BLD AUTO: 4 % (ref 0–6)
ERYTHROCYTE [DISTWIDTH] IN BLOOD BY AUTOMATED COUNT: 13.1 % (ref 11.6–15.1)
GFR SERPL CREATININE-BSD FRML MDRD: 137 ML/MIN/1.73SQ M
GLUCOSE P FAST SERPL-MCNC: 86 MG/DL (ref 65–99)
HCT VFR BLD AUTO: 46.8 % (ref 34.8–46.1)
HDLC SERPL-MCNC: 62 MG/DL
HGB BLD-MCNC: 14.7 G/DL (ref 11.5–15.4)
IMM GRANULOCYTES # BLD AUTO: 0.01 THOUSAND/UL (ref 0–0.2)
IMM GRANULOCYTES NFR BLD AUTO: 0 % (ref 0–2)
LDLC SERPL CALC-MCNC: 73 MG/DL (ref 0–100)
LYMPHOCYTES # BLD AUTO: 2.85 THOUSANDS/ΜL (ref 0.6–4.47)
LYMPHOCYTES NFR BLD AUTO: 41 % (ref 14–44)
MCH RBC QN AUTO: 30.3 PG (ref 26.8–34.3)
MCHC RBC AUTO-ENTMCNC: 31.4 G/DL (ref 31.4–37.4)
MCV RBC AUTO: 97 FL (ref 82–98)
MONOCYTES # BLD AUTO: 0.46 THOUSAND/ΜL (ref 0.17–1.22)
MONOCYTES NFR BLD AUTO: 7 % (ref 4–12)
NEUTROPHILS # BLD AUTO: 3.29 THOUSANDS/ΜL (ref 1.85–7.62)
NEUTS SEG NFR BLD AUTO: 47 % (ref 43–75)
NONHDLC SERPL-MCNC: 103 MG/DL
NRBC BLD AUTO-RTO: 0 /100 WBCS
PLATELET # BLD AUTO: 315 THOUSANDS/UL (ref 149–390)
PMV BLD AUTO: 9.4 FL (ref 8.9–12.7)
POTASSIUM SERPL-SCNC: 4.2 MMOL/L (ref 3.5–5.3)
PROT SERPL-MCNC: 7.6 G/DL (ref 6.4–8.2)
RBC # BLD AUTO: 4.85 MILLION/UL (ref 3.81–5.12)
SODIUM SERPL-SCNC: 137 MMOL/L (ref 136–145)
TRIGL SERPL-MCNC: 150 MG/DL
TSH SERPL DL<=0.05 MIU/L-ACNC: 3.09 UIU/ML (ref 0.46–3.98)
WBC # BLD AUTO: 6.91 THOUSAND/UL (ref 4.31–10.16)

## 2020-11-18 PROCEDURE — 80061 LIPID PANEL: CPT

## 2020-11-18 PROCEDURE — 82306 VITAMIN D 25 HYDROXY: CPT

## 2020-11-18 PROCEDURE — 36415 COLL VENOUS BLD VENIPUNCTURE: CPT

## 2020-11-18 PROCEDURE — 85025 COMPLETE CBC W/AUTO DIFF WBC: CPT

## 2020-11-18 PROCEDURE — 86769 SARS-COV-2 COVID-19 ANTIBODY: CPT

## 2020-11-18 PROCEDURE — 80053 COMPREHEN METABOLIC PANEL: CPT

## 2020-11-18 PROCEDURE — 84443 ASSAY THYROID STIM HORMONE: CPT

## 2020-11-19 LAB — SARS-COV-2 IGG+IGM SERPL QL IA: NORMAL

## 2020-12-21 DIAGNOSIS — E66.09 CLASS 2 OBESITY DUE TO EXCESS CALORIES WITHOUT SERIOUS COMORBIDITY WITH BODY MASS INDEX (BMI) OF 36.0 TO 36.9 IN ADULT: ICD-10-CM

## 2020-12-22 RX ORDER — BUPROPION HYDROCHLORIDE 100 MG/1
100 TABLET, EXTENDED RELEASE ORAL DAILY
Qty: 90 TABLET | Refills: 1 | Status: SHIPPED | OUTPATIENT
Start: 2020-12-22 | End: 2021-02-04

## 2021-02-04 ENCOUNTER — OFFICE VISIT (OUTPATIENT)
Dept: INTERNAL MEDICINE CLINIC | Facility: CLINIC | Age: 21
End: 2021-02-04
Payer: COMMERCIAL

## 2021-02-04 VITALS
WEIGHT: 215.4 LBS | OXYGEN SATURATION: 98 % | BODY MASS INDEX: 32.64 KG/M2 | HEART RATE: 91 BPM | HEIGHT: 68 IN | RESPIRATION RATE: 14 BRPM | DIASTOLIC BLOOD PRESSURE: 84 MMHG | SYSTOLIC BLOOD PRESSURE: 120 MMHG | TEMPERATURE: 98 F

## 2021-02-04 DIAGNOSIS — E66.09 CLASS 2 OBESITY DUE TO EXCESS CALORIES WITHOUT SERIOUS COMORBIDITY WITH BODY MASS INDEX (BMI) OF 36.0 TO 36.9 IN ADULT: ICD-10-CM

## 2021-02-04 DIAGNOSIS — F51.01 PRIMARY INSOMNIA: Primary | ICD-10-CM

## 2021-02-04 PROCEDURE — 99213 OFFICE O/P EST LOW 20 MIN: CPT | Performed by: PHYSICIAN ASSISTANT

## 2021-02-04 PROCEDURE — 1036F TOBACCO NON-USER: CPT | Performed by: PHYSICIAN ASSISTANT

## 2021-02-04 PROCEDURE — 3008F BODY MASS INDEX DOCD: CPT | Performed by: PHYSICIAN ASSISTANT

## 2021-02-04 RX ORDER — BUPROPION HYDROCHLORIDE 150 MG/1
150 TABLET ORAL EVERY MORNING
Qty: 30 TABLET | Refills: 5 | Status: SHIPPED | OUTPATIENT
Start: 2021-02-04 | End: 2021-04-06

## 2021-02-04 NOTE — ASSESSMENT & PLAN NOTE
Continue wellbutrin but increase dose to 150m daily 
Continue without trazodone and continue CBD as this is better tolerated 
38.5

## 2021-02-04 NOTE — PROGRESS NOTES
Assessment/Plan:  Problem List Items Addressed This Visit        Other    Class 2 obesity due to excess calories without serious comorbidity with body mass index (BMI) of 36 0 to 36 9 in adult     Continue wellbutrin but increase dose to 150m daily  Relevant Medications    buPROPion (WELLBUTRIN XL) 150 mg 24 hr tablet    Primary insomnia - Primary     Continue without trazodone and continue CBD as this is better tolerated  Diagnoses and all orders for this visit:    Primary insomnia    Class 2 obesity due to excess calories without serious comorbidity with body mass index (BMI) of 36 0 to 36 9 in adult  -     buPROPion (WELLBUTRIN XL) 150 mg 24 hr tablet; Take 1 tablet (150 mg total) by mouth every morning    Other orders  -     NON FORMULARY; CBD OIL        Class 2 obesity due to excess calories without serious comorbidity with body mass index (BMI) of 36 0 to 36 9 in adult  Continue wellbutrin but increase dose to 150m daily  Primary insomnia  Continue without trazodone and continue CBD as this is better tolerated  Subjective:      Patient ID: Cathalene Heimlich is a 21 y o  female  Pt presents for routine visit  She is doing well overall  She did not like trazodone for insomnia  She has been using CBD oil with good results  She has been using wellbutrin for weight loss and desires going back to wellbutrin 150mg daily to further help her efforts  Labs are up to date  The following portions of the patient's history were reviewed and updated as appropriate:   She has a past medical history of Allergic, Anxiety, Eczema, and UTI (urinary tract infection)  ,  does not have any pertinent problems on file  ,   has a past surgical history that includes Tonsillectomy and adenoidectomy  ,  family history includes Asthma in her father; COPD in her father; Hypertension in her mother  ,   reports that she has never smoked  She has never used smokeless tobacco  She reports current drug use  Drug: Marijuana  She reports that she does not drink alcohol ,  is allergic to dust mite extract and pollen extract     Current Outpatient Medications   Medication Sig Dispense Refill    benzoyl peroxide 5 % external wash       clindamycin (CLEOCIN T) 1 % lotion       hydrOXYzine HCL (ATARAX) 10 mg tablet TAKE 1 3 TABLETS BY MOUTH AT BEDTIME AS NEEDED FOR ITCHING      NON FORMULARY CBD OIL      buPROPion (WELLBUTRIN XL) 150 mg 24 hr tablet Take 1 tablet (150 mg total) by mouth every morning 30 tablet 5    clobetasol propionate (OLUX-E) 0 05 % topical foam APPLY TO ECZEMA ON HANDS & LEGS TWICE A DAY X2WKS, THEN 2-3 DAYS A WK AS NEEDED FOR SEVERE FLARE UPS  2     No current facility-administered medications for this visit  Review of Systems   Constitutional: Negative for chills and fever  HENT: Negative for congestion, ear pain, hearing loss, postnasal drip, rhinorrhea, sinus pressure, sinus pain, sore throat and trouble swallowing  Eyes: Negative for pain and visual disturbance  Respiratory: Negative for cough, chest tightness, shortness of breath and wheezing  Cardiovascular: Negative  Negative for chest pain, palpitations and leg swelling  Gastrointestinal: Negative for abdominal pain, blood in stool, constipation, diarrhea, nausea and vomiting  Endocrine: Negative for cold intolerance, heat intolerance, polydipsia, polyphagia and polyuria  Genitourinary: Negative for difficulty urinating, dysuria, flank pain and urgency  Musculoskeletal: Negative for arthralgias, back pain, gait problem and myalgias  Skin: Negative for rash  Allergic/Immunologic: Negative  Neurological: Negative for dizziness, weakness, light-headedness and headaches  Hematological: Negative  Psychiatric/Behavioral: Negative for behavioral problems, dysphoric mood and sleep disturbance  The patient is not nervous/anxious            PHQ-9 Depression Screening    PHQ-9:   Frequency of the following problems over the past two weeks:              Objective:  Vitals:    02/04/21 0838   BP: 120/84   BP Location: Left arm   Patient Position: Sitting   Cuff Size: Large   Pulse: 91   Resp: 14   Temp: 98 °F (36 7 °C)   SpO2: 98%   Weight: 97 7 kg (215 lb 6 4 oz)   Height: 5' 7 5" (1 715 m)     Body mass index is 33 24 kg/m²  Physical Exam  Nursing note reviewed  Constitutional:       General: She is not in acute distress  Appearance: She is well-developed  She is not diaphoretic  HENT:      Head: Normocephalic and atraumatic  Right Ear: External ear normal       Left Ear: External ear normal       Nose: Nose normal       Mouth/Throat:      Pharynx: No oropharyngeal exudate  Eyes:      General: No scleral icterus  Right eye: No discharge  Left eye: No discharge  Conjunctiva/sclera: Conjunctivae normal       Pupils: Pupils are equal, round, and reactive to light  Neck:      Musculoskeletal: Normal range of motion and neck supple  Thyroid: No thyromegaly  Cardiovascular:      Rate and Rhythm: Normal rate and regular rhythm  Heart sounds: Normal heart sounds  No murmur  No friction rub  No gallop  Pulmonary:      Effort: Pulmonary effort is normal  No respiratory distress  Breath sounds: Normal breath sounds  No wheezing or rales  Abdominal:      General: Bowel sounds are normal  There is no distension  Palpations: Abdomen is soft  Tenderness: There is no abdominal tenderness  Musculoskeletal: Normal range of motion  General: No tenderness or deformity  Skin:     General: Skin is warm and dry  Neurological:      Mental Status: She is alert and oriented to person, place, and time  Cranial Nerves: No cranial nerve deficit  Psychiatric:         Behavior: Behavior normal          Thought Content: Thought content normal          Judgment: Judgment normal        BMI Counseling: Body mass index is 33 24 kg/m²   The BMI is above normal  Nutrition recommendations include decreasing portion sizes, consuming healthier snacks and limiting drinks that contain sugar

## 2021-04-06 ENCOUNTER — TELEPHONE (OUTPATIENT)
Dept: INTERNAL MEDICINE CLINIC | Facility: CLINIC | Age: 21
End: 2021-04-06

## 2021-04-06 DIAGNOSIS — E66.09 CLASS 2 OBESITY DUE TO EXCESS CALORIES WITHOUT SERIOUS COMORBIDITY WITH BODY MASS INDEX (BMI) OF 36.0 TO 36.9 IN ADULT: ICD-10-CM

## 2021-04-06 RX ORDER — BUPROPION HYDROCHLORIDE 100 MG/1
100 TABLET ORAL 2 TIMES DAILY
Qty: 30 TABLET | Refills: 2 | Status: SHIPPED | OUTPATIENT
Start: 2021-04-06 | End: 2021-04-26

## 2021-04-06 NOTE — TELEPHONE ENCOUNTER
Pt needs rx for bupropion 100mg,, was currently on 150mg but too strong, pt does better on 100mg, pls send new rx for Achilles Fitzpatrick, old [facility]

## 2021-04-12 ENCOUNTER — TELEPHONE (OUTPATIENT)
Dept: INTERNAL MEDICINE CLINIC | Facility: CLINIC | Age: 21
End: 2021-04-12

## 2021-04-26 DIAGNOSIS — E66.09 CLASS 2 OBESITY DUE TO EXCESS CALORIES WITHOUT SERIOUS COMORBIDITY WITH BODY MASS INDEX (BMI) OF 36.0 TO 36.9 IN ADULT: ICD-10-CM

## 2021-04-26 RX ORDER — BUPROPION HYDROCHLORIDE 100 MG/1
TABLET ORAL
Qty: 30 TABLET | Refills: 5 | Status: SHIPPED | OUTPATIENT
Start: 2021-04-26

## 2021-05-06 NOTE — PROGRESS NOTES
Diagnoses and all orders for this visit:    Encounter for annual routine gynecological examination  -     Liquid-based pap, diagnostic    HPV vaccine counseling    Screening for STD (sexually transmitted disease)          Pleasant 24 y o  Mary Trimble premenopausal female here for annual exam  She denies any issues with bleeding or her menses  Reports regular cycles with heavy to moderate flow for the first 2 days, tapering to light flow with minimal cramping  First pap today  Denies vaginal issues  Denies pelvic pain  Denies breast concerns  Would like to lose weight, discussed dietary modifications and life style changes that could help promote weight loss   Sexually active, with 1 partner,  does not use condoms, no desire for birth control at this time  Discussed the risks of pregnancy, pt is aware, sts she track her period and only has intercourse 1 x monthly  Advised to call the office if she changes her mind in regards to [facility]  Requests STD testing  Gardasil series discussed  She will make a decision soon      Past Medical History:   Diagnosis Date    Allergic     Anxiety     Eczema     UTI (urinary tract infection)      Past Surgical History:   Procedure Laterality Date    TONSILLECTOMY AND ADENOIDECTOMY       Family History   Problem Relation Age of Onset    Hypertension Mother     COPD Father     Asthma Father      Social History     Tobacco Use    Smoking status: Never Smoker    Smokeless tobacco: Never Used   Substance Use Topics    Alcohol use: Yes     Comment: socially    Drug use: Yes     Types: Marijuana     Comment: patient smoke any longer        Current Outpatient Medications:     benzoyl peroxide 5 % external wash, , Disp: , Rfl:     buPROPion (WELLBUTRIN) 100 mg tablet, TAKE 1 TABLET BY MOUTH TWICE A DAY, Disp: 30 tablet, Rfl: 5    clindamycin (CLEOCIN T) 1 % lotion, , Disp: , Rfl:     clobetasol propionate (OLUX-E) 0 05 % topical foam, APPLY TO ECZEMA ON HANDS & LEGS TWICE A DAY X2WKS, THEN 2-3 DAYS A WK AS NEEDED FOR SEVERE FLARE UPS, Disp: , Rfl: 2    hydrOXYzine HCL (ATARAX) 10 mg tablet, TAKE 1 3 TABLETS BY MOUTH AT BEDTIME AS NEEDED FOR ITCHING, Disp: , Rfl:     NON FORMULARY, CBD OIL, Disp: , Rfl:   Patient Active Problem List    Diagnosis Date Noted    Encounter for annual routine gynecological examination 2021    HPV vaccine counseling 2021    Class 2 obesity due to excess calories without serious comorbidity with body mass index (BMI) of 36 0 to 36 9 in adult 2020    Primary insomnia 2020    Vaginal dryness 10/19/2020    Screening for STD (sexually transmitted disease) 10/19/2020       Allergies   Allergen Reactions    Dust Mite Extract Other (See Comments)     Pt has allergy to dust,grass,mold,and trees  Pt gets post nasal drip   Pollen Extract        OB History    Para Term  AB Living   0 0 0 0 0 0   SAB TAB Ectopic Multiple Live Births   0 0 0 0 0       Vitals:    21 0923   BP: 132/90   BP Location: Right arm   Patient Position: Sitting   Cuff Size: Standard   Resp: 14   Weight: 103 kg (227 lb)   Height: 5' 7" (1 702 m)     Body mass index is 35 55 kg/m²  Review of Systems   Constitutional: Negative for chills, fatigue, fever, headaches, visual disturbances, and unexpected weight change  Respiratory: Negative for cough, & shortness of breath  Cardiovascular: Negative for chest pain       Gastrointestinal: Negative for Abd pain, nausea & vomiting, constipation and diarrhea  Genitourinary: Negative for difficulty urinating, dysuria, hematuria, dyspareunia, unusual vaginal bleeding or discharge  Skin: Negative skin changes    Physical Exam   Constitutional: Alert & Oriented x3, well-developed and well-nourished  No distress  HENT: Atraumatic, Normocephalic, Conjunctivae clear  Neck: Normal range of motion  Neck supple   No thyromegaly, mass, nodules or tenderness  Pulmonary: Effort normal  Lungs clear to ascultation bilateral  Cardiac: RRR, no murmur   Abdominal: Soft  No tenderness or masses  Musculoskeletal: Normal ROM  Skin: Warm & Dry  Psychological: Normal mood, thought content, behavior & judgement     Breasts:   Right: tissue soft without masses, tenderness, skin changes or nipple discharge  No areas of erythema or pain  No subclavicular, axillary, pectoral adenopathy  Left:  tissue soft without masses, tenderness, skin changes or nipple discharge  No areas of erythema or pain  No subclavicular, axillary, pectoral adenopathy    Pelvic exam was performed with patient supine, lithotomy position  Vulva/Labia: Negative rash, tenderness, lesion or injury on the right labia  Negative rash, tenderness, lesion or injury on the left labia  Urethral meatus:  Negative for  tenderness, inflammation or discharge  Uterus: not deviated, enlarged, fixed or tender  Cervix: No CMT, no discharge or friability  Right adnexa: no mass, no tenderness and no fullness  Left adnexa: no mass, no tenderness and no fullness  Vagina: No erythema, tenderness, masses, or foreign body in the vagina  No signs of injury around the vagina  No unusual vaginal discharge   Perineum without lesions, signs of injury, erythema or swelling  Inguinal Canal:        Right: No inguinal adenopathy or hernia present  Left: No inguinal adenopathy or hernia present  SBE advised, Wellness information given  Discussed safe sex practices  Calcium/vit d inclusion in the diet discussed,  F/u in 1 year,  call with any issues,  all concerns addressed

## 2021-05-06 NOTE — PATIENT INSTRUCTIONS
Prophylactic NSAID therapy for painful of heavy menses     Ibuprofen or Naproxen (chose 1 or the other, do not take both), Dose as noted on the box  Typically Ibuprofen dose is 600 mg, (3 tablets) every 6-8 hours  Typically Naproxen dose is 500 mg every 12 hours  Start taking medication 2 days prior to onset of menses and continue taking through the first 3 days of menses  Make sure you take consistently this is important  You need to take with food to decrease any gastrointestinal upset effects    This is proven therapy to reduce you flow and cramping by 50 %    Life style changes that have a positive effect on painful and heavy periods are as follows   Daily physical exercise    Increase fiber, fresh fruits and vegetables in your diet    Increase daily water intake    Heating pads(do not apply directly to skin, apply over clothing or towel)   Warm Baths   Relaxation techniques, meditation, massage, yoga and mindfulness     These are all suggestion for improving your sense of frustrations with your menstrual cycle and improving your overall wellness and lifestyle    Safe Sex Practices   AMBULATORY CARE:   Safe sex practices  are ways to prevent pregnancy and the spread of sexually transmitted infections (STIs)  An STI happens when a virus or bacteria are spread through sexual activity  Safe sex practices help decrease or prevent body fluid exchange during sex  Body fluids include saliva, urine, blood, vaginal fluids, and semen  Oral, vaginal, and anal sex can all spread STIs  Seek care immediately if:   · A condom breaks, leaks, or slips off while you are having sex  · You notice sores on your penis, vagina, anal area, or skin around them  · You have had unsafe sex and want to discuss emergency contraception or treatment for STI exposure  Call your doctor if:   · You or your female sex partner might be pregnant  · You have questions or concerns about your condition or care      Safe sex practices to follow before you have sex:   · Talk to a new partner before you have sex  Tell your partner if you have an STI  Ask about his or her sex history and if he or she has a current or past STI  Your partner may need to be tested and treated  Do not have sex while you are being treated for an STI, or with a partner who is being treated  · Limit your number of sex partners  More than one sex partner can increase your risk for an STI  Do not have sex with anyone whose sex history you do not know  · Get tested for STIs if needed  Get tested if you had sex with someone who has an STI  Get tested if you have unprotected sex with any new partner  · Talk to your healthcare provider about birth control  Birth control can help prevent an unwanted pregnancy  There are many different types of birth control  Talk to your healthcare provider about which birth control method is right for you  · Ask about medicines to lower your risk for some STIs:      ? Vaccines  can help protect you from hepatitis A, hepatitis B, and the human papillomavirus (HPV)  The HPV vaccine is usually given at 11 years, but it may be given through 26 years to both females and males  Your provider can give you more information on vaccines to prevent STIs  ? Pre-exposure prophylaxis (PrEP)  may be given if you are at high risk for HIV  PrEP is taken every day to prevent the virus from fully infecting the body  · Do not use alcohol or drugs before sex  These can prevent you from thinking clearly and increase your risk for unsafe sex  Safe sex practices to follow while you are having sex:   · Use condoms and barrier methods for all types of sexual contact  This includes oral, vaginal, and anal sex  Male and female condoms are available  Make sure that the condom fits and is put on correctly  Rubber latex sheets or dental dams can be used for oral sex  Use a new condom or latex barrier each time you have sex   Use latex condoms, if possible  Lambskin or natural condoms do not prevent STIs  If you or your partner is allergic to latex, use a nonlatex product, such as polyurethane  Use a second form of birth control with the condom to prevent pregnancy and STIs  Do not use male and female condoms together  · Only use water-based lubricants during sex  Water-based lubricants help prevent sores or cuts in the vagina or on the penis  Prevent sores or cuts to decrease your risk for an STI  Do not use oil-based lubricants, such as baby oil or hand lotion, with latex condoms or barriers  These will weaken the latex and may cause the condom to break  · Do not use chemicals that irritate your skin  Products that contain chemical irritants, such as spermicides, can irritate the lining of your vagina or rectum  Irritation may cause sores that can increase your risk for an STI  · Be careful when you have sex if you have open sores or cuts  Open sores or cuts may increase your risk for an STI  Keep all open sores or cuts covered during sex  Do not have oral sex if you have cuts or sores in your mouth  · Do not do activities that can pass germs  Do not use saliva as a lubricant or share sex toys  Follow up with your doctor as directed:  Write down your questions so you remember to ask them during your visits  © Copyright [address] Information is for End User's use only and may not be sold, redistributed or otherwise used for commercial purposes  All illustrations and images included in CareNotes® are the copyrighted property of A D A M , Inc  or [address] [place], [place] [address]   The above information is an  only  It is not intended as medical advice for individual conditions or treatments  Talk to your doctor, nurse or pharmacist before following any medical regimen to see if it is safe and effective for you        HPV (Human Papillomavirus) Vaccine for Adults   AMBULATORY CARE:   The human papillomavirus (HPV) vaccine  is an injection given to females and males to protect against human papillomavirus infection  HPV is most commonly spread through sexual activity  It can also be spread from a mother to her baby during delivery  The HPV vaccine is most effective if given before sexual activity begins  This allows your body to build almost complete protection against HPV before you have contact with the virus  The HPV vaccine is still effective after sexual activity has begun  How the vaccine is given:  The HPV vaccine can be given with other vaccines  The vaccine is given in 2 or 3 doses through age 32:  · The first dose  is given at any time  · The second dose  is given 1 to 2 months after the first dose  · The third dose  is given 6 months after the first dose  More information about how the vaccine is given: You may need 1 more dose of the HPV vaccine if any of the following is true:  · You only received 1 dose of the HPV vaccine before 13years of age  · You received 2 doses of the HPV vaccine less than 5 months apart before 13years of age  Reasons you should not get the HPV vaccine, or should wait to get it:   · You had a severe allergic reaction to a dose of the vaccine  · You are pregnant  Your healthcare provider will tell you when you can get the vaccine  · You are sick or have a fever  You may need to wait to get the vaccine until symptoms go away  Risks of the HPV vaccine: You may have pain, redness, or swelling where the shot was given  You may have a fever or headache  You may have an allergic reaction to the vaccine  This can be life-threatening  Call your local emergency number (911 in the [address] [place] [address],[address]) if:   · You have signs of a severe allergic reaction, such as trouble breathing, hives, or wheezing  Seek care immediately if:   · You have a high fever or behavior changes that concern you  Call your doctor if:   · You have questions or concerns about the HPV vaccine        Apply a warm compress to the area to relieve swelling and pain  Follow up with your doctor as directed:  Write down your questions so you remember to ask them during your visits  © Copyright [address] Information is for End User's use only and may not be sold, redistributed or otherwise used for commercial purposes  All illustrations and images included in CareNotes® are the copyrighted property of [employer] M , Inc  or [employer]   The above information is an  only  It is not intended as medical advice for individual conditions or treatments  Talk to your doctor, nurse or pharmacist before following any medical regimen to see if it is safe and effective for you  Breast Self Exam for Women   AMBULATORY CARE:   A breast self-exam (BSE)  is a way to check your breasts for lumps and other changes  Regular BSEs can help you know how your breasts normally look and feel  Most breast lumps or changes are not cancer, but you should always have them checked by a healthcare provider  Why you should do a BSE:  Breast cancer is the most common type of cancer in women  Even if you have mammograms, you may still want to do a BSE regularly  If you know how your breasts normally feel and look, it may help you know when to contact your healthcare provider  Mammograms can miss some cancers  You may find a lump during a BSE that did not show up on a mammogram   When you should do a BSE:  If you have periods, you may want to do your BSE 1 week after your period ends  This is the time when your breasts may be the least swollen, lumpy, or tender  You can do regular BSEs even if you are breastfeeding or have breast implants  Call your doctor if:   · You find any lumps or changes in your breasts  · You have breast pain or fluid coming from your nipples  · You have questions or concerns about your condition or care  How to do a BSE:       · Look at your breasts in a mirror    Look at the size and shape of each breast and nipple  Check for swelling, lumps, dimpling, scaly skin, or other skin changes  Look for nipple changes, such as a nipple that is painful or beginning to pull inward  Gently squeeze both nipples and check to see if fluid (that is not breast milk) comes out of them  If you find any of these or other breast changes, contact your healthcare provider  Check your breasts while you sit or  the following 3 positions:    ? Hang your arms down at your sides  ? Raise your hands and join them behind your head  ? Put firm pressure with your hands on your hips  Bend slightly forward while you look at your breasts in the mirror  · Lie down and feel your breasts  When you lie down, your breast tissue spreads out evenly over your chest  This makes it easier for you to feel for lumps and anything that may not be normal for your breasts  Do a BSE on one breast at a time  ? Place a small pillow or towel under your left shoulder  Put your left arm behind your head  ? Use the 3 middle fingers of your right hand  Use your fingertip pads, on the top of your fingers  Your fingertip pad is the most sensitive part of your finger  ? Use small circles to feel your breast tissue  Use your fingertip pads to make dime-sized, overlapping circles on your breast and armpits  Use light, medium, and firm pressure  First, press lightly  Second, press with medium pressure to feel a little deeper into the breast  Last, use firm pressure to feel deep within your breast     ? Examine your entire breast area  Examine the breast area from above the breast to below the breast where you feel only ribs  Make small circles with your fingertips, starting in the middle of your armpit  Make circles going up and down the breast area  Continue toward your breast and all the way across it  Examine the area from your armpit all the way over to the middle of your chest (breastbone)  Stop at the middle of your chest     ?  Move the pillow or towel to your right shoulder, and put your right arm behind your head  Use the 3 fingertip pads of your left hand, and repeat the above steps to do a BSE on your right breast   What else you can do to check for breast problems or cancer:  Talk to your healthcare provider about mammograms  A mammogram is an x-ray of your breasts to screen for breast cancer or other problems  Your provider can tell you the benefits and risks of mammograms  The first mammogram is usually at age 39 or 48  Your provider may recommend you start at 36 or younger if your risk for breast cancer is high  Mammograms usually continue every 1 to 2 years until age 76  Follow up with your doctor as directed:  Write down your questions so you remember to ask them during your visits  © [employer] 2021 Information is for End User's use only and may not be sold, redistributed or otherwise used for commercial purposes  All illustrations and images included in CareNotes® are the copyrighted property of A D A M , Inc  or [employer]   The above information is an  only  It is not intended as medical advice for individual conditions or treatments  Talk to your doctor, nurse or pharmacist before following any medical regimen to see if it is safe and effective for you  Wellness Visit for Adults   AMBULATORY CARE:   A wellness visit  is when you see your healthcare provider to get screened for health problems  Your healthcare provider will also give you advice on how to stay healthy  Write down your questions so you remember to ask them  Ask your healthcare provider how often you should have a wellness visit  What happens at a wellness visit:  Your healthcare provider will ask about your health, and your family history of health problems  This includes high blood pressure, heart disease, and cancer  He or she will ask if you have symptoms that concern you, if you smoke, and about your mood   You may also be asked about your intake of medicines, supplements, food, and alcohol  Any of the following may be done:  · Your weight  will be checked  Your height may also be checked so your body mass index (BMI) can be calculated  Your BMI shows if you are at a healthy weight  · Your blood pressure  and heart rate will be checked  Your temperature may also be checked  · Blood and urine tests  may be done  Blood tests may be done to check your cholesterol levels  Abnormal cholesterol levels increase your risk for heart disease and stroke  You may also need a blood or urine test to check for diabetes if you are at increased risk  Urine tests may be done to look for signs of an infection or kidney disease  · A physical exam  includes checking your heartbeat and lungs with a stethoscope  Your healthcare provider may also check your skin to look for sun damage  · Screening tests  may be recommended  A screening test is done to check for diseases that may not cause symptoms  The screening tests you may need depend on your age, gender, family history, and lifestyle habits  For example, colorectal screening may be recommended if you are 48years old or older  Screening tests you need if you are a woman:   · A Pap smear  is used to screen for cervical cancer  Pap smears are usually done every 3 to 5 years depending on your age  You may need them more often if you have had abnormal Pap smear test results in the past  Ask your healthcare provider how often you should have a Pap smear  · A mammogram  is an x-ray of your breasts to screen for breast cancer  Experts recommend mammograms every 2 years starting at age 48 years  You may need a mammogram at age 52 years or younger if you have an increased risk for breast cancer  Talk to your healthcare provider about when you should start having mammograms and how often you need them  Vaccines you may need:   · Get an influenza vaccine  every year   The influenza vaccine protects you from the flu  Several types of viruses cause the flu  The viruses change over time, so new vaccines are made each year  · Get a tetanus-diphtheria (Td) booster vaccine  every 10 years  This vaccine protects you against tetanus and diphtheria  Tetanus is a severe infection that may cause painful muscle spasms and lockjaw  Diphtheria is a severe bacterial infection that causes a thick covering in the back of your mouth and throat  · Get a human papillomavirus (HPV) vaccine  if you are female and aged 23 to 32 or male 23 to 24 and never received it  This vaccine protects you from HPV infection  HPV is the most common infection spread by sexual contact  HPV may also cause vaginal, penile, and anal cancers  · Get a pneumococcal vaccine  if you are aged 72 years or older  The pneumococcal vaccine is an injection given to protect you from pneumococcal disease  Pneumococcal disease is an infection caused by pneumococcal bacteria  The infection may cause pneumonia, meningitis, or an ear infection  · Get a shingles vaccine  if you are 60 or older, even if you have had shingles before  The shingles vaccine is an injection to protect you from the varicella-zoster virus  This is the same virus that causes chickenpox  Shingles is a painful rash that develops in people who had chickenpox or have been exposed to the virus  How to eat healthy:  My Plate is a model for planning healthy meals  It shows the types and amounts of foods that should go on your plate  Fruits and vegetables make up about half of your plate, and grains and protein make up the other half  A serving of dairy is included on the side of your plate  The amount of calories and serving sizes you need depends on your age, gender, weight, and height  Examples of healthy foods are listed below:  · Eat a variety of vegetables  such as dark green, red, and orange vegetables   You can also include canned vegetables low in sodium (salt) and frozen vegetables without added butter or sauces  · Eat a variety of fresh fruits , canned fruit in 100% juice, frozen fruit, and dried fruit  · Include whole grains  At least half of the grains you eat should be whole grains  Examples include whole-wheat bread, wheat pasta, brown rice, and whole-grain cereals such as oatmeal     · Eat a variety of protein foods such as seafood (fish and shellfish), lean meat, and poultry without skin (turkey and chicken)  Examples of lean meats include pork leg, shoulder, or tenderloin, and beef round, sirloin, tenderloin, and extra lean ground beef  Other protein foods include eggs and egg substitutes, beans, peas, soy products, nuts, and seeds  · Choose low-fat dairy products such as skim or 1% milk or low-fat yogurt, cheese, and cottage cheese  · Limit unhealthy fats  such as butter, hard margarine, and shortening  Exercise:  Exercise at least 30 minutes per day on most days of the week  Some examples of exercise include walking, biking, dancing, and swimming  You can also fit in more physical activity by taking the stairs instead of the elevator or parking farther away from stores  Include muscle strengthening activities 2 days each week  Regular exercise provides many health benefits  It helps you manage your weight, and decreases your risk for type 2 diabetes, heart disease, stroke, and high blood pressure  Exercise can also help improve your mood  Ask your healthcare provider about the best exercise plan for you  General health and safety guidelines:   · Do not smoke  Nicotine and other chemicals in cigarettes and cigars can cause lung damage  Ask your healthcare provider for information if you currently smoke and need help to quit  E-cigarettes or smokeless tobacco still contain nicotine  Talk to your healthcare provider before you use these products  · Limit alcohol    A drink of alcohol is 12 ounces of beer, 5 ounces of wine, or 1½ ounces of liquor  · Lose weight, if needed  Being overweight increases your risk of certain health conditions  These include heart disease, high blood pressure, type 2 diabetes, and certain types of cancer  · Protect your skin  Do not sunbathe or use tanning beds  Use sunscreen with a SPF 15 or higher  Apply sunscreen at least 15 minutes before you go outside  Reapply sunscreen every 2 hours  Wear protective clothing, hats, and sunglasses when you are outside  · Drive safely  Always wear your seatbelt  Make sure everyone in your car wears a seatbelt  A seatbelt can save your life if you are in an accident  Do not use your cell phone when you are driving  This could distract you and cause an accident  Pull over if you need to make a call or send a text message  · Practice safe sex  Use latex condoms if are sexually active and have more than one partner  Your healthcare provider may recommend screening tests for sexually transmitted infections (STIs)  · Wear helmets, lifejackets, and protective gear  Always wear a helmet when you ride a bike or motorcycle, go skiing, or play sports that could cause a head injury  Wear protective equipment when you play sports  Wear a lifejacket when you are on a boat or doing water sports  © Copyright [address] Information is for End User's use only and may not be sold, redistributed or otherwise used for commercial purposes  All illustrations and images included in CareNotes® are the copyrighted property of [employer] M , Inc  or [place] [employer]   The above information is an  only  It is not intended as medical advice for individual conditions or treatments  Talk to your doctor, nurse or pharmacist before following any medical regimen to see if it is safe and effective for you

## 2021-05-11 ENCOUNTER — ANNUAL EXAM (OUTPATIENT)
Dept: OBGYN CLINIC | Facility: CLINIC | Age: 21
End: 2021-05-11
Payer: COMMERCIAL

## 2021-05-11 VITALS
HEIGHT: 67 IN | SYSTOLIC BLOOD PRESSURE: 132 MMHG | DIASTOLIC BLOOD PRESSURE: 90 MMHG | WEIGHT: 227 LBS | RESPIRATION RATE: 14 BRPM | BODY MASS INDEX: 35.63 KG/M2

## 2021-05-11 DIAGNOSIS — Z71.85 HPV VACCINE COUNSELING: ICD-10-CM

## 2021-05-11 DIAGNOSIS — Z11.3 SCREENING FOR STD (SEXUALLY TRANSMITTED DISEASE): ICD-10-CM

## 2021-05-11 DIAGNOSIS — Z01.419 ENCOUNTER FOR ANNUAL ROUTINE GYNECOLOGICAL EXAMINATION: Primary | ICD-10-CM

## 2021-05-11 PROCEDURE — S0610 ANNUAL GYNECOLOGICAL EXAMINA: HCPCS | Performed by: OBSTETRICS & GYNECOLOGY

## 2021-05-11 PROCEDURE — 88175 CYTOPATH C/V AUTO FLUID REDO: CPT | Performed by: OBSTETRICS & GYNECOLOGY

## 2021-05-11 NOTE — ASSESSMENT & PLAN NOTE
Gardasil 9 was advised for prevention of HPV-related disease  We discussed risks/benefits, SE's/AE's at length and all questions were answered  Written info was provided for review  The patient agrees to consider and is aware she may call to schedule injection #1 at any time

## 2021-05-14 ENCOUNTER — TELEPHONE (OUTPATIENT)
Dept: INTERNAL MEDICINE CLINIC | Facility: CLINIC | Age: 21
End: 2021-05-14

## 2021-05-14 DIAGNOSIS — Z11.3 SCREENING FOR STD (SEXUALLY TRANSMITTED DISEASE): Primary | ICD-10-CM

## 2021-05-14 DIAGNOSIS — Z11.8 SCREENING FOR CHLAMYDIAL DISEASE: ICD-10-CM

## 2021-05-14 LAB
C TRACH DNA SPEC QL NAA+PROBE: ABNORMAL
LAB AP GYN PRIMARY INTERPRETATION: NORMAL
LAB AP LMP: NORMAL
Lab: NORMAL
N GONORRHOEA DNA SPEC QL NAA+PROBE: ABNORMAL

## 2021-05-14 NOTE — TELEPHONE ENCOUNTER
Pt is on low dose, she may take every other day for 1-2 weeks then discontinue, schedule appt if she would like to discuss alternatives, thanks

## 2021-06-21 ENCOUNTER — TELEPHONE (OUTPATIENT)
Dept: OBGYN CLINIC | Facility: CLINIC | Age: 21
End: 2021-06-21

## 2021-06-21 NOTE — TELEPHONE ENCOUNTER
Pt returned my call  Pt stated no longer having sx of uti  Pt stated sx went away right after she sent message and so she believes it was just from her work out  Pt has no gyn complaints  Pt was grateful for the call back

## 2021-06-21 NOTE — TELEPHONE ENCOUNTER
----- Message from Garlin Hands sent at 6/19/2021 11:41 AM EDT -----  Regarding: Non-Urgent Medical Question  Contact: [phone]  Hi! I think I may have a uti, I have to go for my screening for the sti/std test  Would you be able to ask them to test for a uti as well, so that I can get the proper medication for it? I dont really want to go to an urgent care for it and already have to get the std test  I am on my period so I am not sure if that will effect anything  Thank you, let me know!

## 2022-05-10 PROBLEM — F51.01 PRIMARY INSOMNIA: Status: RESOLVED | Noted: 2020-11-18 | Resolved: 2022-05-10
